# Patient Record
Sex: MALE | Race: WHITE | NOT HISPANIC OR LATINO | Employment: OTHER | URBAN - METROPOLITAN AREA
[De-identification: names, ages, dates, MRNs, and addresses within clinical notes are randomized per-mention and may not be internally consistent; named-entity substitution may affect disease eponyms.]

---

## 2017-08-30 ENCOUNTER — LAB REQUISITION (OUTPATIENT)
Dept: LAB | Facility: HOSPITAL | Age: 67
End: 2017-08-30
Payer: MEDICARE

## 2017-08-30 DIAGNOSIS — J34.89 OTHER SPECIFIED DISORDERS OF NOSE AND NASAL SINUSES: ICD-10-CM

## 2017-08-30 PROCEDURE — 86335 IMMUNFIX E-PHORSIS/URINE/CSF: CPT | Performed by: OTOLARYNGOLOGY

## 2017-09-04 LAB — B2 TRANSFERRIN FLD-MCNC: NORMAL PG/ML

## 2019-01-14 ENCOUNTER — EVALUATION (OUTPATIENT)
Dept: PHYSICAL THERAPY | Facility: CLINIC | Age: 69
End: 2019-01-14
Payer: MEDICARE

## 2019-01-14 DIAGNOSIS — M54.16 LUMBAR RADICULOPATHY: Primary | ICD-10-CM

## 2019-01-14 PROCEDURE — 97112 NEUROMUSCULAR REEDUCATION: CPT | Performed by: PHYSICAL THERAPIST

## 2019-01-14 PROCEDURE — 97161 PT EVAL LOW COMPLEX 20 MIN: CPT | Performed by: PHYSICAL THERAPIST

## 2019-01-14 RX ORDER — OMEPRAZOLE 20 MG/1
20 CAPSULE, DELAYED RELEASE ORAL DAILY
COMMUNITY

## 2019-01-14 RX ORDER — CLOBETASOL PROPIONATE 0.5 MG/G
CREAM TOPICAL 2 TIMES DAILY
COMMUNITY

## 2019-01-14 RX ORDER — ESOMEPRAZOLE MAGNESIUM 10 MG/1
10 GRANULE, FOR SUSPENSION, EXTENDED RELEASE ORAL
COMMUNITY

## 2019-01-14 RX ORDER — ASPIRIN AND DIPYRIDAMOLE 25; 200 MG/1; MG/1
1 CAPSULE, EXTENDED RELEASE ORAL EVERY 12 HOURS SCHEDULED
COMMUNITY

## 2019-01-14 RX ORDER — OXYCODONE AND ACETAMINOPHEN 10; 325 MG/1; MG/1
1 TABLET ORAL EVERY 4 HOURS PRN
COMMUNITY

## 2019-01-14 RX ORDER — AZELASTINE 1 MG/ML
1 SPRAY, METERED NASAL 2 TIMES DAILY
COMMUNITY

## 2019-01-14 RX ORDER — BUPROPION HYDROCHLORIDE 100 MG/1
100 TABLET ORAL 2 TIMES DAILY
COMMUNITY

## 2019-01-14 RX ORDER — IPRATROPIUM BROMIDE 21 UG/1
2 SPRAY, METERED NASAL EVERY 12 HOURS
COMMUNITY

## 2019-01-14 RX ORDER — ATORVASTATIN CALCIUM 10 MG/1
10 TABLET, FILM COATED ORAL DAILY
COMMUNITY

## 2019-01-14 RX ORDER — FLUTICASONE PROPIONATE 50 MCG
1 SPRAY, SUSPENSION (ML) NASAL DAILY
COMMUNITY

## 2019-01-14 RX ORDER — LEUCOVORIN CALCIUM 10 MG/1
10 TABLET ORAL DAILY
COMMUNITY

## 2019-01-14 RX ORDER — HYDROXYZINE HYDROCHLORIDE 10 MG/1
25 TABLET, FILM COATED ORAL EVERY 6 HOURS PRN
COMMUNITY

## 2019-01-14 NOTE — PROGRESS NOTES
PT Evaluation     Today's date: 2019  Patient name: Sammi Hammond  : 1950  MRN: [id number]  Referring provider: Jose A Crandall DO  Dx:   Encounter Diagnosis     ICD-10-CM    1  Lumbar radiculopathy M54 16                   Assessment  Assessment details: Sammi Hammond is a 77 y o  Male who presents with chronic LBP  Pt has decreased lumbar and B LE flexibility as well as poor posture  Pt currently has difficulty with prolonged standing/walking, difficulty bending, and difficulty participating in recreation  Pt would benefit from skilled PT to address the above impairments and to return to pain free function  Impairments: abnormal or restricted ROM and poor posture   Functional limitations: difficulty with prolonged standing/walking, difficulty bending, difficulty participating in recreation  Symptom irritability: moderateBarriers to therapy: none  Understanding of Dx/Px/POC: good   Prognosis: good    Goals  1  Pt will be independent with HEP upon DC  2  Pt's HS and lumbar flexibility will increase by at least 25% to allow for bending without difficulty  3  Pt's pain will be no more than 3/10 to allow for prolonged walking up to 20 minutes at a time  4  Pt will report return to recreation without difficulty  Plan  Patient would benefit from: skilled physical therapy  Planned therapy interventions: joint mobilization, neuromuscular re-education, postural training, therapeutic activities, therapeutic exercise and functional ROM exercises  Frequency: 2x week  Duration in visits: 16  Duration in weeks: 8  Treatment plan discussed with: patient        Subjective Evaluation    History of Present Illness  Date of onset: 2016  Mechanism of injury: Pt reports a history of chronic LBP  Pt reports that within the last 3 years pt reports numbness in R LE  Pt had recent steroid injections that improved his numbness  X-rays and MRI revealed bulging disc at L2-3 and central stenosis at L3-L5  Pt presents to OP PT  Recurrent probem    Quality of life: good    Pain  Current pain ratin  At best pain ratin  At worst pain ratin      Diagnostic Tests  X-ray: abnormal  MRI studies: abnormal  Treatments  Previous treatment: medication and injection treatment  Patient Goals  Patient goals for therapy: decreased pain  Patient goal: to return to hobby of working on cars        Objective     Static Posture     Lumbar Spine   Flattened  Palpation   Left   Tenderness of the quadratus lumborum       Tenderness     Additional Tenderness Details  Slight discomfort with PAIVM of L2 and L5    Active Range of Motion     Additional Active Range of Motion Details  Lumbar flexion: 75%  Lumbar extension:50%    Strength/Myotome Testing     Left Hip   Planes of Motion   Flexion: 5  Extension: 5  Abduction: 5    Right Hip   Planes of Motion   Flexion: 5  Extension: 5  Abduction: 5    Left Knee   Flexion: 5  Extension: 5    Right Knee   Flexion: 5  Extension: 5    Additional Strength Details  HS: L 55deg/R 55 deg            Precautions: none    Daily Treatment Diary     Manual              PA glides                                                                     Exercise Diary              Bike             LTR 5"x15            Mod Piriformis stretch 3x30" ea            Supine hip flexor stretch 3x30"            B HS stretch with strap 3x30"            PPT with ball squeeze             Bridging             Supine SLR             Prone hip ext             [place] on physioball

## 2019-01-14 NOTE — LETTER
January 15, 2019    Moreliamack Rogers DO  [address]  [place] [address]    Patient: Pepper Tierney   YOB: 1950   Date of Visit: 2019     Encounter Diagnosis     ICD-10-CM    1  Lumbar radiculopathy M54 16        Dear Dr Tavo Levi:    Please review the attached Plan of Care from [facility] recent visit  Please verify that you agree therapy should continue by signing the attached document and sending it back to our office  If you have any questions or concerns, please don't hesitate to call  Sincerely,    Nirav Radford, PT      Referring Provider:      I certify that I have read the below Plan of Care and certify the need for these services furnished under this plan of treatment while under my care  Morelia Rogers DO  [address]  [place] [address]  VIA Facsimile: [phone]          PT Evaluation     Today's date: 2019  Patient name: Pepper Tierney  : 1950  MRN: [id number]  Referring provider: Colton Mchugh DO  Dx:   Encounter Diagnosis     ICD-10-CM    1  Lumbar radiculopathy M54 16                   Assessment  Assessment details: Pepper Tierney is a 77 y o  Male who presents with chronic LBP  Pt has decreased lumbar and B LE flexibility as well as poor posture  Pt currently has difficulty with prolonged standing/walking, difficulty bending, and difficulty participating in recreation  Pt would benefit from skilled PT to address the above impairments and to return to pain free function  Impairments: abnormal or restricted ROM and poor posture   Functional limitations: difficulty with prolonged standing/walking, difficulty bending, difficulty participating in recreation  Symptom irritability: moderateBarriers to therapy: none  Understanding of Dx/Px/POC: good   Prognosis: good    Goals  1  Pt will be independent with HEP upon DC    2  Pt's HS and lumbar flexibility will increase by at least 25% to allow for bending without difficulty  3  Pt's pain will be no more than 3/10 to allow for prolonged walking up to 20 minutes at a time  4  Pt will report return to recreation without difficulty  Plan  Patient would benefit from: skilled physical therapy  Planned therapy interventions: joint mobilization, neuromuscular re-education, postural training, therapeutic activities, therapeutic exercise and functional ROM exercises  Frequency: 2x week  Duration in visits: 16  Duration in weeks: 8  Treatment plan discussed with: patient        Subjective Evaluation    History of Present Illness  Date of onset: 2016  Mechanism of injury: Pt reports a history of chronic LBP  Pt reports that within the last 3 years pt reports numbness in R LE  Pt had recent steroid injections that improved his numbness  X-rays and MRI revealed bulging disc at L2-3 and central stenosis at L3-L5  Pt presents to OP PT  Recurrent probem    Quality of life: good    Pain  Current pain ratin  At best pain ratin  At worst pain ratin      Diagnostic Tests  X-ray: abnormal  MRI studies: abnormal  Treatments  Previous treatment: medication and injection treatment  Patient Goals  Patient goals for therapy: decreased pain  Patient goal: to return to hobby of working on cars        Objective     Static Posture     Lumbar Spine   Flattened  Palpation   Left   Tenderness of the quadratus lumborum       Tenderness     Additional Tenderness Details  Slight discomfort with PAIVM of L2 and L5    Active Range of Motion     Additional Active Range of Motion Details  Lumbar flexion: 75%  Lumbar extension:50%    Strength/Myotome Testing     Left Hip   Planes of Motion   Flexion: 5  Extension: 5  Abduction: 5    Right Hip   Planes of Motion   Flexion: 5  Extension: 5  Abduction: 5    Left Knee   Flexion: 5  Extension: 5    Right Knee   Flexion: 5  Extension: 5    Additional Strength Details  HS: L 55deg/R 55 deg            Precautions: none    Daily Treatment Diary     Manual              [employer] house                                                                     Exercise Diary  1/14            Bike             LTR 5"x15            Mod Piriformis stretch 3x30" ea            Supine hip flexor stretch 3x30"            B HS stretch with strap 3x30"            PPT with ball squeeze             Bridging             Supine SLR             Prone hip ext             [place] on physioball

## 2019-01-15 ENCOUNTER — TRANSCRIBE ORDERS (OUTPATIENT)
Dept: PHYSICAL THERAPY | Facility: CLINIC | Age: 69
End: 2019-01-15

## 2019-01-15 DIAGNOSIS — M54.16 LUMBAR RADICULOPATHY: Primary | ICD-10-CM

## 2019-01-16 ENCOUNTER — OFFICE VISIT (OUTPATIENT)
Dept: PHYSICAL THERAPY | Facility: CLINIC | Age: 69
End: 2019-01-16
Payer: MEDICARE

## 2019-01-16 DIAGNOSIS — M54.16 LUMBAR RADICULOPATHY: Primary | ICD-10-CM

## 2019-01-16 PROCEDURE — 97112 NEUROMUSCULAR REEDUCATION: CPT | Performed by: PHYSICAL THERAPIST

## 2019-01-16 PROCEDURE — 97110 THERAPEUTIC EXERCISES: CPT | Performed by: PHYSICAL THERAPIST

## 2019-01-18 ENCOUNTER — OFFICE VISIT (OUTPATIENT)
Dept: PHYSICAL THERAPY | Facility: CLINIC | Age: 69
End: 2019-01-18
Payer: MEDICARE

## 2019-01-18 DIAGNOSIS — M54.16 LUMBAR RADICULOPATHY: Primary | ICD-10-CM

## 2019-01-18 PROCEDURE — 97110 THERAPEUTIC EXERCISES: CPT | Performed by: PHYSICAL THERAPIST

## 2019-01-18 PROCEDURE — 97112 NEUROMUSCULAR REEDUCATION: CPT | Performed by: PHYSICAL THERAPIST

## 2019-01-18 NOTE — PROGRESS NOTES
Daily Note     Today's date: 2019  Patient name: Cyndy Cantu  : 1950  MRN: [id number]  Referring provider: Porsha Hitchcock DO  Dx:   Encounter Diagnosis     ICD-10-CM    1  Lumbar radiculopathy M54 16                   Subjective:  "I've been feeling fine  I got to do my stretches last night "      Objective: See treatment diary below  Manual              PA glides                                                                     Exercise Diary            Bike  6' 8'          LTR 5"x15 5"x15 5"x15          Mod Piriformis stretch 3x30" ea 3x30" 3x30"          Supine hip flexor stretch 3x30" 3x30" 3x30"          B HS stretch with strap 3x30" 3x30" 3x30"          PPT with ball squeeze  5"x15 5"x20          Bridging  x15 x20          Bridging with marching   x15          Supine SLR  x15 x20          Prone hip ext   x20          [place] on physioball                                                                                                                                                     Assessment: Pt has good knowledge of HEP  No complaints t/o session  Will continue to progress  Plan: Continue per plan of care

## 2019-01-23 ENCOUNTER — OFFICE VISIT (OUTPATIENT)
Dept: PHYSICAL THERAPY | Facility: CLINIC | Age: 69
End: 2019-01-23
Payer: MEDICARE

## 2019-01-23 DIAGNOSIS — M54.16 LUMBAR RADICULOPATHY: Primary | ICD-10-CM

## 2019-01-23 PROCEDURE — 97110 THERAPEUTIC EXERCISES: CPT | Performed by: PHYSICAL THERAPIST

## 2019-01-23 PROCEDURE — 97112 NEUROMUSCULAR REEDUCATION: CPT | Performed by: PHYSICAL THERAPIST

## 2019-01-23 NOTE — PROGRESS NOTES
Daily Note     Today's date: 2019  Patient name: Julio Guillen  : 1950  MRN: [id number]  Referring provider: Dede Loya DO  Dx:   Encounter Diagnosis     ICD-10-CM    1  Lumbar radiculopathy M54 16                   Subjective: "I haven't really done my stretches  But I feel good "      Objective: See treatment diary below  Manual              PA glides                                                                     Exercise Diary           Bike  6' 8' 8'         LTR 5"x15 5"x15 5"x15 5"x15         Mod Piriformis stretch 3x30" ea 3x30" 3x30" 3x30"         Supine hip flexor stretch 3x30" 3x30" 3x30"          B HS stretch with strap 3x30" 3x30" 3x30" 3x30"         PPT with ball squeeze  5"x15 5"x20 5"x20         Bridging  x15 x20 x20         Bridging with marching   x15 x15         Bridging with SLR  x15 x15 x15         Prone hip ext   x20 x20         [place] on physioball    x15         Alt Ue/Le on physioball    x15                                                                                                                                    Assessment: Good tolerance to all tx  Slight difficulty with stabilization exercises  Plan: Continue per plan of care

## 2019-01-25 ENCOUNTER — OFFICE VISIT (OUTPATIENT)
Dept: PHYSICAL THERAPY | Facility: CLINIC | Age: 69
End: 2019-01-25
Payer: MEDICARE

## 2019-01-25 DIAGNOSIS — M54.16 LUMBAR RADICULOPATHY: Primary | ICD-10-CM

## 2019-01-25 PROCEDURE — 97112 NEUROMUSCULAR REEDUCATION: CPT | Performed by: PHYSICAL THERAPIST

## 2019-01-25 PROCEDURE — 97110 THERAPEUTIC EXERCISES: CPT | Performed by: PHYSICAL THERAPIST

## 2019-01-25 NOTE — PROGRESS NOTES
Daily Note     Today's date: 2019  Patient name: Shira Pritchett  : 1950  MRN: [id number]  Referring provider: Rich Esparza DO  Dx:   Encounter Diagnosis     ICD-10-CM    1  Lumbar radiculopathy M54 16                   Subjective: Pt reports no new changes  Objective: See treatment diary below  Manual              PA glides                                                                     Exercise Diary          Bike  6' 8' 8' 8'        LTR 5"x15 5"x15 5"x15 5"x15 5"x15        Mod Piriformis stretch 3x30" ea 3x30" 3x30" 3x30" 3x30"        Supine hip flexor stretch 3x30" 3x30" 3x30"          B HS stretch with strap 3x30" 3x30" 3x30" 3x30" 3x30"        PPT with ball squeeze  5"x15 5"x20 5"x20 5"x20        Bridging  x15 x20 x20 x20        Bridging with marching   x15 x15 x15        Bridging with SLR  x15 x15 x15 x15        Prone hip ext   x20 x20 x20        [place] on physioball    x15 x20        Alt Ue/Le on physioball    x15 x20                                                                                                                                   Assessment: Pt tolerates all activities with ease  Needs occasional v/c's for form  Plan: Continue per plan of care

## 2019-01-30 ENCOUNTER — OFFICE VISIT (OUTPATIENT)
Dept: PHYSICAL THERAPY | Facility: CLINIC | Age: 69
End: 2019-01-30
Payer: MEDICARE

## 2019-01-30 DIAGNOSIS — M54.16 LUMBAR RADICULOPATHY: Primary | ICD-10-CM

## 2019-01-30 PROCEDURE — 97112 NEUROMUSCULAR REEDUCATION: CPT | Performed by: PHYSICAL THERAPIST

## 2019-01-30 PROCEDURE — 97110 THERAPEUTIC EXERCISES: CPT | Performed by: PHYSICAL THERAPIST

## 2019-01-30 NOTE — PROGRESS NOTES
Daily Note     Today's date: 2019  Patient name: Capo Loaiza  : 1950  MRN: [id number]  Referring provider: Jasmina Tellez DO  Dx:   Encounter Diagnosis     ICD-10-CM    1  Lumbar radiculopathy M54 16                   Subjective: "My back is still fine "      Objective: See treatment diary below    Exercise Diary         Bike  6' 8' 8' 8' 8'       LTR 5"x15 5"x15 5"x15 5"x15 5"x15 5"x15       Mod Piriformis stretch 3x30" ea 3x30" 3x30" 3x30" 3x30" 3x30"       Supine hip flexor stretch 3x30" 3x30" 3x30"   3x30"       B HS stretch with strap 3x30" 3x30" 3x30" 3x30" 3x30" 3x30"       PPT with ball squeeze  5"x15 5"x20 5"x20 5"x20 5"x20       Bridging  x15 x20 x20 x20 x20       Bridging with marching   x15 x15 x15 x20       Bridging with SLR  x15 x15 x15 x15 x20       Prone hip ext   x20 x20 x20 x20       [place] on physioball    x15 x20 x20       Alt Ue/Le on physioball    x15 x20 x20                                                                                                                                  Assessment: Pt completes all exercises with ease  Pt remains pain free t/o session  Plan: Continue per plan of care

## 2019-02-01 ENCOUNTER — OFFICE VISIT (OUTPATIENT)
Dept: PHYSICAL THERAPY | Facility: CLINIC | Age: 69
End: 2019-02-01
Payer: MEDICARE

## 2019-02-01 DIAGNOSIS — M54.16 LUMBAR RADICULOPATHY: Primary | ICD-10-CM

## 2019-02-01 PROCEDURE — 97112 NEUROMUSCULAR REEDUCATION: CPT

## 2019-02-01 PROCEDURE — 97110 THERAPEUTIC EXERCISES: CPT

## 2019-02-06 ENCOUNTER — OFFICE VISIT (OUTPATIENT)
Dept: PHYSICAL THERAPY | Facility: CLINIC | Age: 69
End: 2019-02-06
Payer: MEDICARE

## 2019-02-06 DIAGNOSIS — M54.16 LUMBAR RADICULOPATHY: Primary | ICD-10-CM

## 2019-02-06 PROCEDURE — 97110 THERAPEUTIC EXERCISES: CPT | Performed by: PHYSICAL THERAPIST

## 2019-02-06 PROCEDURE — 97112 NEUROMUSCULAR REEDUCATION: CPT | Performed by: PHYSICAL THERAPIST

## 2019-02-06 NOTE — PROGRESS NOTES
Daily Note     Today's date: 2019  Patient name: Shira Pritchett  : 1950  MRN: [id number]  Referring provider: Rich Esparza DO  Dx:   Encounter Diagnosis     ICD-10-CM    1  Lumbar radiculopathy M54 16                   Subjective: "The Dr told me to keep up with my exercises "      Objective: See treatment diary below  Exercise Diary         Bike   6' 8' 8' 8' 8'  8'  8'       LTR 5"x15 5"x15 5"x15 5"x15 5"x15 5"x15  5"x15  5"x15       Mod Piriformis stretch 3x30" ea 3x30" 3x30" 3x30" 3x30" 3x30"  3x30"  3x30"       Supine hip flexor stretch 3x30" 3x30" 3x30"     3x30"  3x30"  3x30"       B HS stretch with strap 3x30" 3x30" 3x30" 3x30" 3x30" 3x30"  3x30"  3x30"       PPT with ball squeeze   5"x15 5"x20 5"x20 5"x20 5"x20  5"x20  5"x20       Bridging   x15 x20 x20 x20 x20  x20  x20       Bridging with marching     x15 x15 x15 x20  x20  x20       Bridging with SLR   x15 x15 x15 x15 x20  x 20  x20       Prone hip ext     x20 x20 x20 x20  x20  x20       [place] on physioball       x15 x20 x20  nv  x20       Alt Ue/Le on physioball       x15 x20 x20  x20  x20                                                                                                                                                                                                                                      Assessment:  Pt completes all exercises with ease  Remains pain free t/o session  Scheduled for RE next session  Plan: Continue per plan of care

## 2019-02-08 ENCOUNTER — OFFICE VISIT (OUTPATIENT)
Dept: PHYSICAL THERAPY | Facility: CLINIC | Age: 69
End: 2019-02-08
Payer: MEDICARE

## 2019-02-08 DIAGNOSIS — M54.16 LUMBAR RADICULOPATHY: Primary | ICD-10-CM

## 2019-02-08 PROCEDURE — 97110 THERAPEUTIC EXERCISES: CPT | Performed by: PHYSICAL THERAPIST

## 2019-02-08 PROCEDURE — 97112 NEUROMUSCULAR REEDUCATION: CPT | Performed by: PHYSICAL THERAPIST

## 2019-02-08 NOTE — PROGRESS NOTES
Daily Note     Today's date: 2019  Patient name: Sylwia Hurst  : 1950  MRN: [id number]  Referring provider: Emily Huff DO  Dx:   Encounter Diagnosis     ICD-10-CM    1  Lumbar radiculopathy M54 16                   Subjective: "I'm feeling good "      Objective: See treatment diary below  Exercise Diary       Bike   6' 8' 8' 8' 8'  8'  8'  12'     LTR 5"x15 5"x15 5"x15 5"x15 5"x15 5"x15  5"x15  5"x15  5"x15     Mod Piriformis stretch 3x30" ea 3x30" 3x30" 3x30" 3x30" 3x30"  3x30"  3x30"  3x30"     Supine hip flexor stretch 3x30" 3x30" 3x30"     3x30"  3x30"  3x30"  3x30"     B HS stretch with strap 3x30" 3x30" 3x30" 3x30" 3x30" 3x30"  3x30"  3x30"  3x30"     PPT with ball squeeze   5"x15 5"x20 5"x20 5"x20 5"x20  5"x20  5"x20  5"x20     Bridging   x15 x20 x20 x20 x20  x20  x20  x20     Bridging with marching     x15 x15 x15 x20  x20  x20  x20     Bridging with SLR   x15 x15 x15 x15 x20  x 20  x20  x20     Prone hip ext     x20 x20 x20 x20  x20  x20  x20     [place] on physioball       x15 x20 x20  nv  x20  x20     Alt Ue/Le on physioball       x15 x20 x20  x20  x20  x20                                                                                                                                                                                                                                    Assessment: Pt completes all exercises with ease  Anticipate DC next week  Plan: Continue per plan of care

## 2019-02-13 ENCOUNTER — EVALUATION (OUTPATIENT)
Dept: PHYSICAL THERAPY | Facility: CLINIC | Age: 69
End: 2019-02-13
Payer: MEDICARE

## 2019-02-13 DIAGNOSIS — M54.16 LUMBAR RADICULOPATHY: Primary | ICD-10-CM

## 2019-02-13 PROCEDURE — 97110 THERAPEUTIC EXERCISES: CPT | Performed by: PHYSICAL THERAPIST

## 2019-02-13 PROCEDURE — 97112 NEUROMUSCULAR REEDUCATION: CPT | Performed by: PHYSICAL THERAPIST

## 2019-02-13 NOTE — LETTER
2019    Ambar Fields DO  [address] [address]    Patient: Jama Putnam   YOB: 1950   Date of Visit: 2019     Encounter Diagnosis     ICD-10-CM    1  Lumbar radiculopathy M54 16        Dear Dr Samina Quintana:    Please review the attached Plan of Care from [facility] recent visit  Please verify that you agree therapy should continue by signing the attached document and sending it back to our office  If you have any questions or concerns, please don't hesitate to call  Sincerely,    Donnie Saldaña, PT      Referring Provider:      I certify that I have read the below Plan of Care and certify the need for these services furnished under this plan of treatment while under my care  Ambar Fields DO  [address] [address]  VIA Facsimile: [phone]          Daily Note     Today's date: 2019  Patient name: Jama Putnam  : 1950  MRN: [id number]  Referring provider: Kiesha Rocha DO  Dx:   Encounter Diagnosis     ICD-10-CM    1  Lumbar radiculopathy M54 16                   Assessment  Pt has made improvements in lumbar and B LE flexibility  All LE strength is WNL  Pt reports being pain free over the past several weeks and has returned to prior activities without difficulty  Pt would benefit from one more week of skilled PT to assure independence with HEP  Goals  1  Pt will be independent with HEP upon DC - ongoing  2  Pt's HS and lumbar flexibility will increase by at least 25% to allow for bending without difficulty  -met  3  Pt's pain will be no more than 3/10 to allow for prolonged walking up to 20 minutes at a time  - met  4  Pt will report return to recreation without difficulty  - met    Plan  Patient would benefit from: skilled physical therapy  Frequency: 1x week  Duration in visits: 2  Duration in weeks: 2    Pain  Current pain ratin  At best pain ratin  At worst pain ratin        Active Range of Motion     Additional Active Range of Motion Details  Lumbar flexion: WNL  Lumbar extension: 75%      HS: L 75deg/R 75 deg            Objective: See treatment diary below  Exercise Diary     Bike   6' 8' 8' 8' 8'  8'  8'  12'  12'   LTR 5"x15 5"x15 5"x15 5"x15 5"x15 5"x15  5"x15  5"x15  5"x15  5"x15   Mod Piriformis stretch 3x30" ea 3x30" 3x30" 3x30" 3x30" 3x30"  3x30"  3x30"  3x30"  3x30"   Supine hip flexor stretch 3x30" 3x30" 3x30"     3x30"  3x30"  3x30"  3x30"     B HS stretch with strap 3x30" 3x30" 3x30" 3x30" 3x30" 3x30"  3x30"  3x30"  3x30"     PPT with ball squeeze   5"x15 5"x20 5"x20 5"x20 5"x20  5"x20  5"x20  5"x20  5"x20   Bridging   x15 x20 x20 x20 x20  x20  x20  x20  x20   Bridging with marching     x15 x15 x15 x20  x20  x20  x20  x20   Bridging with SLR   x15 x15 x15 x15 x20  x 20  x20  x20  x20   Prone hip ext     x20 x20 x20 x20  x20  x20  x20  x20   [place] on physioball       x15 x20 x20  nv  x20  x20  x20   Alt Ue/Le on physioball       x15 x20 x20  x20  x20  x20  x20

## 2019-02-13 NOTE — PROGRESS NOTES
Daily Note/Discharge Summary    Today's date: 2019  Patient name: Pepper Tierney  : 1950  MRN: [id number]  Referring provider: Colton Mchugh DO  Dx:   Encounter Diagnosis     ICD-10-CM    1  Lumbar radiculopathy M54 16                   Assessment  Pt has made improvements in lumbar and B LE flexibility  All LE strength is WNL  Pt reports being pain free over the past several weeks and has returned to prior activities without difficulty  Pt would benefit from one more week of skilled PT to assure independence with HEP  Goals  1  Pt will be independent with HEP upon DC - ongoing  2  Pt's HS and lumbar flexibility will increase by at least 25% to allow for bending without difficulty  -met  3  Pt's pain will be no more than 3/10 to allow for prolonged walking up to 20 minutes at a time  - met  4  Pt will report return to recreation without difficulty  - met    Plan  Patient would benefit from: skilled physical therapy  Frequency: 1x week  Duration in visits: 2  Duration in weeks: 2    Pain  Current pain ratin  At best pain ratin  At worst pain ratin        Active Range of Motion     Additional Active Range of Motion Details  Lumbar flexion: WNL  Lumbar extension: 75%      HS: L 75deg/R 75 deg            Objective: See treatment diary below  Exercise Diary     Bike   6' 8' 8' 8' 8'  8'  8'  12'  12'   LTR 5"x15 5"x15 5"x15 5"x15 5"x15 5"x15  5"x15  5"x15  5"x15  5"x15   Mod Piriformis stretch 3x30" ea 3x30" 3x30" 3x30" 3x30" 3x30"  3x30"  3x30"  3x30"  3x30"   Supine hip flexor stretch 3x30" 3x30" 3x30"     3x30"  3x30"  3x30"  3x30"     B HS stretch with strap 3x30" 3x30" 3x30" 3x30" 3x30" 3x30"  3x30"  3x30"  3x30"     PPT with ball squeeze   5"x15 5"x20 5"x20 5"x20 5"x20  5"x20  5"x20  5"x20  5"x20   Bridging   x15 x20 x20 x20 x20  x20  x20  x20  x20   Bridging with marching     x15 x15 x15 x20  x20  x20  x20  x20   Bridging with SLR   x15 x15 x15 x15 x20  x 20  x20  x20  x20   Prone hip ext     x20 x20 x20 x20  x20  x20  x20  x20   [place] on physioball       x15 x20 x20  nv  x20  x20  x20   Alt Ue/Le on physioball       x15 x20 x20  x20  x20  x20  x20

## 2019-02-15 ENCOUNTER — APPOINTMENT (OUTPATIENT)
Dept: PHYSICAL THERAPY | Facility: CLINIC | Age: 69
End: 2019-02-15
Payer: MEDICARE

## 2019-02-20 ENCOUNTER — APPOINTMENT (OUTPATIENT)
Dept: PHYSICAL THERAPY | Facility: CLINIC | Age: 69
End: 2019-02-20
Payer: MEDICARE

## 2019-02-22 ENCOUNTER — APPOINTMENT (OUTPATIENT)
Dept: PHYSICAL THERAPY | Facility: CLINIC | Age: 69
End: 2019-02-22
Payer: MEDICARE

## 2019-02-27 ENCOUNTER — APPOINTMENT (OUTPATIENT)
Dept: PHYSICAL THERAPY | Facility: CLINIC | Age: 69
End: 2019-02-27
Payer: MEDICARE

## 2021-03-02 DIAGNOSIS — Z23 ENCOUNTER FOR IMMUNIZATION: ICD-10-CM

## 2022-05-06 NOTE — PROGRESS NOTES
Daily Note     Today's date: 2019  Patient name: Carmen Kelsey  : 1950  MRN: [id number]  Referring provider: Roxana Garcia DO  Dx:   Encounter Diagnosis     ICD-10-CM    1  Lumbar radiculopathy M54 16                   Subjective: "I haven;t done any of my exercises "      Objective: See treatment diary below  Manual              PA joshuades                                                                     Exercise Diary             Bike  6'           LTR 5"x15 5"x15           Mod Piriformis stretch 3x30" ea 3x30"           Supine hip flexor stretch 3x30" 3x30"           B HS stretch with strap 3x30" 3x30"           PPT with ball squeeze  5"x15           Bridging  x15           Supine SLR  x15           Prone hip ext             [place] on physioball                                                                                                                                                   Assessment: Pt had good tolerance to all initiated exercises  No complaints of pain t/o session  Plan: Continue per plan of care  Problem: Falls - Risk of  Goal: *Absence of Falls  Description: Document Lyn Davies Fall Risk and appropriate interventions in the flowsheet. Outcome: Progressing Towards Goal  Note: Fall Risk Interventions:  Mobility Interventions: Bed/chair exit alarm,OT consult for ADLs,Patient to call before getting OOB,PT Consult for mobility concerns,PT Consult for assist device competence         Medication Interventions: Patient to call before getting OOB,Bed/chair exit alarm,Teach patient to arise slowly,Evaluate medications/consider consulting pharmacy    Elimination Interventions: Stay With Me (per policy),Bed/chair exit alarm,Call light in reach,Patient to call for help with toileting needs,Urinal in reach    History of Falls Interventions: Bed/chair exit alarm,Door open when patient unattended,Room close to nurse's station         Problem: Patient Education: Go to Patient Education Activity  Goal: Patient/Family Education  Outcome: Progressing Towards Goal     Problem: Pressure Injury - Risk of  Goal: *Prevention of pressure injury  Description: Document Jabier Scale and appropriate interventions in the flowsheet. Outcome: Progressing Towards Goal  Note: Pressure Injury Interventions:  Sensory Interventions: Check visual cues for pain,Keep linens dry and wrinkle-free,Monitor skin under medical devices,Turn and reposition approx. every two hours (pillows and wedges if needed),Pressure redistribution bed/mattress (bed type)    Moisture Interventions: Maintain skin hydration (lotion/cream),Apply protective barrier, creams and emollients    Activity Interventions: PT/OT evaluation,Increase time out of bed    Mobility Interventions: Float heels,PT/OT evaluation,Turn and reposition approx.  every two hours(pillow and wedges)    Nutrition Interventions: Document food/fluid/supplement intake    Friction and Shear Interventions: Apply protective barrier, creams and emollients,HOB 30 degrees or less,Minimize layers Problem: Patient Education: Go to Patient Education Activity  Goal: Patient/Family Education  Outcome: Progressing Towards Goal     Problem: Aspiration - Risk of  Goal: *Absence of aspiration  Outcome: Progressing Towards Goal     Problem: Patient Education: Go to Patient Education Activity  Goal: Patient/Family Education  Outcome: Progressing Towards Goal

## 2024-09-26 ENCOUNTER — OFFICE VISIT (OUTPATIENT)
Dept: URGENT CARE | Facility: CLINIC | Age: 74
End: 2024-09-26
Payer: MEDICARE

## 2024-09-26 VITALS
HEART RATE: 97 BPM | SYSTOLIC BLOOD PRESSURE: 110 MMHG | WEIGHT: 140 LBS | OXYGEN SATURATION: 97 % | RESPIRATION RATE: 18 BRPM | TEMPERATURE: 98 F | DIASTOLIC BLOOD PRESSURE: 68 MMHG | HEIGHT: 62 IN | BODY MASS INDEX: 25.76 KG/M2

## 2024-09-26 DIAGNOSIS — S61.412A LACERATION OF LEFT HAND WITHOUT FOREIGN BODY, INITIAL ENCOUNTER: ICD-10-CM

## 2024-09-26 DIAGNOSIS — Z23 ENCOUNTER FOR IMMUNIZATION: Primary | ICD-10-CM

## 2024-09-26 PROCEDURE — 90715 TDAP VACCINE 7 YRS/> IM: CPT

## 2024-09-26 PROCEDURE — 99213 OFFICE O/P EST LOW 20 MIN: CPT

## 2024-09-26 PROCEDURE — 90471 IMMUNIZATION ADMIN: CPT

## 2024-09-26 PROCEDURE — G0463 HOSPITAL OUTPT CLINIC VISIT: HCPCS

## 2024-09-26 NOTE — PROGRESS NOTES
[facility]        NAME: Yeison Miranda is a 73 y.o. male  : 1950    MRN: [id number]  DATE: 2024  TIME: 5:36 PM    Assessment and Plan   Encounter for immunization [Z23]  1. Encounter for immunization  Tdap Vaccine greater than or equal to 8yo      2. Laceration of left hand without foreign body, initial encounter  Tdap Vaccine greater than or equal to 8yo            Patient Instructions   Keep the area clean and dry  Do not remove the Steri-Strips  You can reinforce them with the extra ones I gave you  Watch for signs of infection be any redness swelling any pus coming out of the wound.  If it becomes infected please come back  You did receive your tetanus shot today    Follow up with PCP in 3-5 days.  Proceed to  ER if symptoms worsen.    If tests have been performed at [facility], our office will contact you with results if changes need to be made to the care plan discussed with you at the visit.  You can review your full results on [facility] MyChart.    Chief Complaint     Chief Complaint   Patient presents with    Laceration     C/o L forearm laceration from a 6'X4' sign falling while removing it from the wall. Bleeding controlled, endoreses taking a baby aspirin daily and d/c aggrenox 2-3 weeks prior. Denies head strike or other injury.         History of Present Illness       Is a 73-year-old male who presents today with a skin tear to the left wrist.  He was taking a sign down off the wall in his garage when it slipped and hit him in the wrist skin tear is about 1-1/2 cm long.  Tetanus shot is not up-to-date.  He denies any pain in his wrist and is able to extend and flex.  Area cleaned and Steri-Strips applied.  We discussed good skin care.  Patient is aware of what to look for with an infection.    Laceration         Review of Systems   Review of Systems   Constitutional: Negative.    HENT: Negative.     Respiratory: Negative.     Cardiovascular: Negative.     Gastrointestinal: Negative.    Skin:  Positive for wound.   Neurological: Negative.    Hematological: Negative.          Current Medications       Current Outpatient Medications:     atorvastatin (LIPITOR) 10 mg tablet, Take 10 mg by mouth daily, Disp: , Rfl:     clobetasol (TEMOVATE) 0.05 % cream, Apply topically 2 (two) times a day, Disp: , Rfl:     Empagliflozin (JARDIANCE) 10 MG TABS tablet, Take 10 mg by mouth every morning, Disp: , Rfl:     hydrocortisone (WESTCORT) 0.2 % cream, Apply topically 2 (two) times a day, Disp: , Rfl:     hydrOXYzine HCL (ATARAX) 10 mg tablet, Take 25 mg by mouth every 6 (six) hours as needed for itching, Disp: , Rfl:     lisinopril (ZESTRIL) 10 mg tablet, Take 10 mg by mouth daily, Disp: , Rfl:     omeprazole (PriLOSEC) 20 mg delayed release capsule, Take 20 mg by mouth daily, Disp: , Rfl:     aspirin-dipyridamole (AGGRENOX)  mg per 12 hr capsule, Take 1 capsule by mouth every 12 (twelve) hours (Patient not taking: Reported on 9/26/2024), Disp: , Rfl:     azelastine (ASTELIN) 0.1 % nasal spray, 1 spray into each nostril 2 (two) times a day Use in each nostril as directed (Patient not taking: Reported on 8/11/2023), Disp: , Rfl:     buPROPion (WELLBUTRIN) 100 mg tablet, Take 100 mg by mouth 2 (two) times a day (Patient not taking: Reported on 8/11/2023), Disp: , Rfl:     esomeprazole (NexIUM) 10 MG packet, Take 10 mg by mouth every morning before breakfast (Patient not taking: Reported on 8/11/2023), Disp: , Rfl:     fluticasone (FLONASE) 50 mcg/act nasal spray, 1 spray into each nostril daily (Patient not taking: Reported on 8/11/2023), Disp: , Rfl:     fluticasone (FLONASE) 50 mcg/act nasal spray, 2 sprays into each nostril daily (Patient not taking: Reported on 9/26/2024), Disp: 48 mL, Rfl: 3    gabapentin (NEURONTIN) 300 mg capsule, Take three capsules 4 hours prior to the procedure (Patient not taking: Reported on 8/11/2023), Disp: 3 capsule, Rfl: 0    ipratropium  "(ATROVENT) 0.03 % nasal spray, 2 sprays into each nostril every 12 (twelve) hours (Patient not taking: Reported on 8/11/2023), Disp: , Rfl:     ipratropium (ATROVENT) 0.03 % nasal spray, 2 sprays into each nostril before meals or as needed up to 3x/daily (Patient not taking: Reported on 9/26/2024), Disp: 90 mL, Rfl: 3    leucovorin (WELLCOVORIN) 10 MG tablet, Take 10 mg by mouth daily (Patient not taking: Reported on 8/11/2023), Disp: , Rfl:     methotrexate 2.5 mg tablet, Take by mouth once a week (Patient not taking: Reported on 4/10/2023), Disp: , Rfl:     oxyCODONE-acetaminophen (PERCOCET)  mg per tablet, Take 1 tablet by mouth every 4 (four) hours as needed for moderate pain (Patient not taking: Reported on 9/26/2024), Disp: , Rfl:     traMADol-acetaminophen (ULTRACET) 37.5-325 mg per tablet, Take 1 tablet by mouth every 6 (six) hours as needed for moderate pain (Patient not taking: Reported on 9/26/2024), Disp: , Rfl:     Current Allergies     Allergies as of 09/26/2024 - Reviewed 09/26/2024   Allergen Reaction Noted    Adhesive [medical tape] Hives 02/08/2021    Iodinated contrast media Hives and Lip Swelling 01/01/1995    Omnipen [ampicillin]  01/14/2019    Peanut oil - food allergy  01/14/2019            The following portions of the patient's history were reviewed and updated as appropriate: allergies, current medications, past family history, past medical history, past social history, past surgical history and problem list.     Past Medical History:   Diagnosis Date    KATHERIN (acute kidney injury) (HCC) 2019    Allergic     Anesthesia complication     woke up from a past surgery, he was slightly combative :flailing around\" with arms    Diabetes mellitus (HCC)     HL (hearing loss)     Hypertension     Hypertension     Mediastinal lymphadenopathy 2016    PAD (peripheral artery disease) (HCC)     Pulmonary hamartoma (HCC)     left lung    Rotavirus enteritis     Spinal stenosis     Visual impairment  " "      Past Surgical History:   Procedure Laterality Date    BACK SURGERY      CATARACT EXTRACTION W/  INTRAOCULAR LENS IMPLANT      CHOLECYSTECTOMY      EPIDURAL BLOCK INJECTION      FRACTURE SURGERY Bilateral     arm as a child    PENILE CYST REMOVAL  07/10/2020    SHOULDER ARTHROSCOPY W/ SUBACROMIAL DECOMPRESSION AND DISTAL CLAVICLE EXCISION      TONSILLECTOMY         Family History   Problem Relation Age of Onset    Stroke Mother     Stroke Father     Stroke Sister     Heart failure Brother     Cirrhosis Brother          Medications have been verified.        Objective   /68 (BP Location: Right arm, Patient Position: Sitting)   Pulse 97   Temp 98 °F (36.7 °C)   Resp 18   Ht 5' 2\" (1.575 m)   Wt 63.5 kg (140 lb)   SpO2 97%   BMI 25.61 kg/m²   No LMP for male patient.       Physical Exam     Physical Exam  Constitutional:       Appearance: Normal appearance.   HENT:      Head: Normocephalic and atraumatic.      Mouth/Throat:      Mouth: Mucous membranes are moist.      Pharynx: Oropharynx is clear.   Eyes:      Conjunctiva/sclera: Conjunctivae normal.      Pupils: Pupils are equal, round, and reactive to light.   Cardiovascular:      Rate and Rhythm: Normal rate and regular rhythm.      Pulses: Normal pulses.      Heart sounds: Normal heart sounds.   Pulmonary:      Effort: Pulmonary effort is normal.      Breath sounds: Normal breath sounds.   Abdominal:      General: Abdomen is flat. Bowel sounds are normal.   Musculoskeletal:         General: Normal range of motion.   Skin:     General: Skin is warm and dry.      Capillary Refill: Capillary refill takes less than 2 seconds.   Neurological:      General: No focal deficit present.      Mental Status: He is alert and oriented to person, place, and time. Mental status is at baseline.   Psychiatric:         Mood and Affect: Mood normal.         Thought Content: Thought content normal.         Judgment: Judgment normal.                   "

## 2024-09-26 NOTE — PATIENT INSTRUCTIONS
Keep the area clean and dry  Do not remove the Steri-Strips  You can reinforce them with the extra ones I gave you  Watch for signs of infection be any redness swelling any pus coming out of the wound.  If it becomes infected please come back  You did receive your tetanus shot today